# Patient Record
Sex: FEMALE | Race: WHITE | NOT HISPANIC OR LATINO
[De-identification: names, ages, dates, MRNs, and addresses within clinical notes are randomized per-mention and may not be internally consistent; named-entity substitution may affect disease eponyms.]

---

## 2022-04-12 PROBLEM — Z00.00 ENCOUNTER FOR PREVENTIVE HEALTH EXAMINATION: Status: ACTIVE | Noted: 2022-04-12

## 2022-04-13 ENCOUNTER — APPOINTMENT (OUTPATIENT)
Dept: ORTHOPEDIC SURGERY | Facility: CLINIC | Age: 52
End: 2022-04-13
Payer: COMMERCIAL

## 2022-04-13 DIAGNOSIS — Z78.9 OTHER SPECIFIED HEALTH STATUS: ICD-10-CM

## 2022-04-13 PROCEDURE — 99215 OFFICE O/P EST HI 40 MIN: CPT | Mod: 25

## 2022-04-13 PROCEDURE — 20610 DRAIN/INJ JOINT/BURSA W/O US: CPT | Mod: 50

## 2022-04-13 PROCEDURE — 73030 X-RAY EXAM OF SHOULDER: CPT | Mod: RT

## 2022-04-13 PROCEDURE — 76882 US LMTD JT/FCL EVL NVASC XTR: CPT | Mod: RT

## 2022-04-13 RX ORDER — ESCITALOPRAM OXALATE 5 MG/1
TABLET, FILM COATED ORAL
Refills: 0 | Status: ACTIVE | COMMUNITY

## 2022-04-13 NOTE — DISCUSSION/SUMMARY
[de-identified] : At the conclusion of the procedure the patient required additional monitoring as she became lightheaded.  We hydrated her and gave her glucose.  After an additional 15 minutes we re-evaluated her and discharged her.

## 2022-04-13 NOTE — PHYSICAL EXAM
[NL (0-180)] : full active abduction 0-180 degrees [NL (0-70)] : full internal rotation 0-70 degrees [NL (0-90)] : full external rotation 0-90 degrees [Right] : right shoulder [] : negative Rosana [FreeTextEntry3] : old clavicle fracture and ac separation

## 2022-04-13 NOTE — PROCEDURE
[Shoulder] : shoulder [Pain] : pain [Inflammation] : inflammation [Alcohol] : alcohol [Betadine] : betadine [Ethyl Chloride sprayed topically] : ethyl chloride sprayed topically [Sterile technique used] : sterile technique used [Ganglion cyst] : ganglion cyst [Call if redness, pain or fever occur] : call if redness, pain or fever occur [Apply ice for 15min out of every hour for the next 12-24 hours as tolerated] : apply ice for 15 minutes out of every hour for the next 12-24 hours as tolerated [Patient was advised to rest the joint(s) for ____ days] : patient was advised to rest the joint(s) for [unfilled] days [Previous OTC use and PT nontherapeutic] : patient has tried OTC's including aspirin, Ibuprofen, Aleve, etc or prescription NSAIDS, and/or exercises at home and/or physical therapy without satisfactory response [Patient had decreased mobility in the joint] : patient had decreased mobility in the joint [Risks, benefits, alternatives discussed / Verbal consent obtained] : the risks benefits, and alternatives have been discussed, and verbal consent was obtained [Mass] : mass [Cyst] : cyst [Effusion or other fluid collection] : effusion or other fluid collection [de-identified] : 2cc [de-identified] : clear fluid [de-identified] : 1cm cyst contiguous with the ac joint [FreeTextEntry3] : P

## 2022-04-13 NOTE — HISTORY OF PRESENT ILLNESS
[de-identified] : Patient presents with a cyst like mass on the shoulder. She denies any injury but states that the mass is growing.

## 2022-11-16 ENCOUNTER — APPOINTMENT (OUTPATIENT)
Dept: ORTHOPEDIC SURGERY | Facility: CLINIC | Age: 52
End: 2022-11-16

## 2022-11-16 DIAGNOSIS — Z78.9 OTHER SPECIFIED HEALTH STATUS: ICD-10-CM

## 2022-11-16 DIAGNOSIS — M67.462 GANGLION, LEFT KNEE: ICD-10-CM

## 2022-11-16 PROCEDURE — 73562 X-RAY EXAM OF KNEE 3: CPT | Mod: LT

## 2022-11-16 PROCEDURE — 20611 DRAIN/INJ JOINT/BURSA W/US: CPT | Mod: LT

## 2022-11-16 PROCEDURE — 99214 OFFICE O/P EST MOD 30 MIN: CPT | Mod: 25

## 2023-01-11 ENCOUNTER — APPOINTMENT (OUTPATIENT)
Dept: ORTHOPEDIC SURGERY | Facility: CLINIC | Age: 53
End: 2023-01-11
Payer: COMMERCIAL

## 2023-01-11 DIAGNOSIS — M19.012 PRIMARY OSTEOARTHRITIS, LEFT SHOULDER: ICD-10-CM

## 2023-01-11 PROCEDURE — 99213 OFFICE O/P EST LOW 20 MIN: CPT

## 2023-01-16 ENCOUNTER — FORM ENCOUNTER (OUTPATIENT)
Age: 53
End: 2023-01-16

## 2023-01-19 NOTE — PHYSICAL EXAM
[NL (0-180)] : full active abduction 0-180 degrees [NL (0-70)] : full internal rotation 0-70 degrees [NL (0-90)] : full external rotation 0-90 degrees [Right] : right shoulder [5___] : hamstring 5[unfilled]/5 [Left] : left shoulder [FreeTextEntry3] : old clavicle fracture and ac separation [] : non-antalgic

## 2023-01-19 NOTE — HISTORY OF PRESENT ILLNESS
[de-identified] : Patient presents with a cyst like mass on the shoulder. She denies any injury but states that the mass is growing.

## 2023-01-19 NOTE — DISCUSSION/SUMMARY
[de-identified] : At the conclusion of the procedure the patient required additional monitoring as she became lightheaded.  We hydrated her and gave her glucose.  After an additional 15 minutes we re-evaluated her and discharged her.

## 2023-01-19 NOTE — PROCEDURE
[Left] : of the left [Shoulder] : shoulder [Pain] : pain [Inflammation] : inflammation [Alcohol] : alcohol [Betadine] : betadine [Ethyl Chloride sprayed topically] : ethyl chloride sprayed topically [Sterile technique used] : sterile technique used [Ganglion cyst] : ganglion cyst [Call if redness, pain or fever occur] : call if redness, pain or fever occur [Apply ice for 15min out of every hour for the next 12-24 hours as tolerated] : apply ice for 15 minutes out of every hour for the next 12-24 hours as tolerated [Patient was advised to rest the joint(s) for ____ days] : patient was advised to rest the joint(s) for [unfilled] days [Previous OTC use and PT nontherapeutic] : patient has tried OTC's including aspirin, Ibuprofen, Aleve, etc or prescription NSAIDS, and/or exercises at home and/or physical therapy without satisfactory response [Patient had decreased mobility in the joint] : patient had decreased mobility in the joint [Risks, benefits, alternatives discussed / Verbal consent obtained] : the risks benefits, and alternatives have been discussed, and verbal consent was obtained [Mass] : mass [Cyst] : cyst [Effusion or other fluid collection] : effusion or other fluid collection [de-identified] : 2cc [de-identified] : clear fluid [de-identified] : 1cm cyst contiguous with the ac joint

## 2023-02-23 ENCOUNTER — FORM ENCOUNTER (OUTPATIENT)
Age: 53
End: 2023-02-23

## 2023-03-02 ENCOUNTER — APPOINTMENT (OUTPATIENT)
Dept: ORTHOPEDIC SURGERY | Facility: CLINIC | Age: 53
End: 2023-03-02
Payer: COMMERCIAL

## 2023-03-02 DIAGNOSIS — M67.412: ICD-10-CM

## 2023-03-02 PROCEDURE — 99214 OFFICE O/P EST MOD 30 MIN: CPT

## 2023-03-02 NOTE — PHYSICAL EXAM
[Left] : left shoulder [NL (0-180)] : full active abduction 0-180 degrees [NL (0-70)] : full internal rotation 0-70 degrees [NL (0-90)] : full external rotation 0-90 degrees [Right] : right shoulder [] : negative Rosana [FreeTextEntry3] : old clavicle fracture and ac separation

## 2023-03-02 NOTE — DATA REVIEWED
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 It may be cystic or vascular. There is a more sizeable T2 bright multilobular septated lesion 2.7x0.6cm along the superficial aspect of the more posterior/superior deltoid muscle at the deep subcutaneous muscle belly junction nonspecific and cystic.

## 2023-03-02 NOTE — DISCUSSION/SUMMARY
[de-identified] : Patient would prefer to watch her shoulder and continue to eval for increased swelling of cystic area.

## 2024-01-12 ENCOUNTER — APPOINTMENT (OUTPATIENT)
Dept: ORTHOPEDIC SURGERY | Facility: CLINIC | Age: 54
End: 2024-01-12
Payer: COMMERCIAL

## 2024-01-12 VITALS — WEIGHT: 152 LBS | BODY MASS INDEX: 21.76 KG/M2 | HEIGHT: 70 IN

## 2024-01-12 DIAGNOSIS — M76.32 ILIOTIBIAL BAND SYNDROME, LEFT LEG: ICD-10-CM

## 2024-01-12 DIAGNOSIS — M70.62 TROCHANTERIC BURSITIS, LEFT HIP: ICD-10-CM

## 2024-01-12 PROCEDURE — 99214 OFFICE O/P EST MOD 30 MIN: CPT

## 2024-01-12 PROCEDURE — 73502 X-RAY EXAM HIP UNI 2-3 VIEWS: CPT

## 2024-01-12 NOTE — DISCUSSION/SUMMARY
[de-identified] : I reviewed patient's radiographs and discussed her condition and treatment options.  I advised course of PT to work on IT band syndrome.  Defer bursa injection.  Follow up in 6 weeks.  Patient voiced understanding and agreement with the plan.

## 2024-01-12 NOTE — PHYSICAL EXAM
[NL (120)] : flexion 120 degrees [NL (30)] : extension 30 degrees [NL (35)] : adduction 35 degrees [NL (45)] : abduction 45 degrees [5___] : extension 5[unfilled]/5 [2+] : posterior tibialis pulse: 2+ [] : patient ambulates without assistive device [Left] : left hip with pelvis [AP] : anteroposterior [Lateral] : lateral [There are no fractures, subluxations or dislocations. No significant abnormalities are seen] : There are no fractures, subluxations or dislocations. No significant abnormalities are seen

## 2024-01-12 NOTE — HISTORY OF PRESENT ILLNESS
[de-identified] :  Patient presents for evaluation on LT hip pain. 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 Patient states its from wear and tear. This has been ongoing for some time now and wants to have it checked out before getting worse. States she has sharp aching pain. Patient is unable to sit or lay on her left side. Denies pain in her groin. Patient has tried massages and HEP.

## 2024-02-02 ENCOUNTER — APPOINTMENT (OUTPATIENT)
Dept: ORTHOPEDIC SURGERY | Facility: CLINIC | Age: 54
End: 2024-02-02

## 2025-07-01 ENCOUNTER — APPOINTMENT (OUTPATIENT)
Dept: NEUROLOGY | Facility: CLINIC | Age: 55
End: 2025-07-01